# Patient Record
Sex: FEMALE | Race: WHITE | NOT HISPANIC OR LATINO | URBAN - METROPOLITAN AREA
[De-identification: names, ages, dates, MRNs, and addresses within clinical notes are randomized per-mention and may not be internally consistent; named-entity substitution may affect disease eponyms.]

---

## 2018-01-19 ENCOUNTER — OFFICE VISIT (OUTPATIENT)
Dept: URGENT CARE | Facility: PHYSICIAN GROUP | Age: 33
End: 2018-01-19
Payer: COMMERCIAL

## 2018-01-19 VITALS
WEIGHT: 225 LBS | HEART RATE: 88 BPM | HEIGHT: 68 IN | TEMPERATURE: 98 F | SYSTOLIC BLOOD PRESSURE: 138 MMHG | DIASTOLIC BLOOD PRESSURE: 80 MMHG | BODY MASS INDEX: 34.1 KG/M2 | OXYGEN SATURATION: 97 %

## 2018-01-19 DIAGNOSIS — E66.9 OBESITY (BMI 30-39.9): ICD-10-CM

## 2018-01-19 DIAGNOSIS — R05.9 COUGH: ICD-10-CM

## 2018-01-19 DIAGNOSIS — J06.9 UPPER RESPIRATORY TRACT INFECTION, UNSPECIFIED TYPE: ICD-10-CM

## 2018-01-19 PROCEDURE — 99214 OFFICE O/P EST MOD 30 MIN: CPT | Performed by: PHYSICIAN ASSISTANT

## 2018-01-19 RX ORDER — BENZONATATE 100 MG/1
100 CAPSULE ORAL 3 TIMES DAILY PRN
Qty: 60 CAP | Refills: 0 | Status: SHIPPED | OUTPATIENT
Start: 2018-01-19 | End: 2019-03-19

## 2018-01-19 RX ORDER — AZITHROMYCIN 250 MG/1
TABLET, FILM COATED ORAL
Qty: 1 QUANTITY SUFFICIENT | Refills: 0 | Status: SHIPPED | OUTPATIENT
Start: 2018-01-19 | End: 2019-03-19

## 2018-01-19 ASSESSMENT — ENCOUNTER SYMPTOMS
SPUTUM PRODUCTION: 1
FEVER: 1
ABDOMINAL PAIN: 0
SHORTNESS OF BREATH: 0
NAUSEA: 0
VOMITING: 0
CHILLS: 1
SORE THROAT: 0
MYALGIAS: 0
WHEEZING: 0
COUGH: 1
DIARRHEA: 0

## 2018-01-19 ASSESSMENT — PAIN SCALES - GENERAL: PAINLEVEL: NO PAIN

## 2018-01-19 NOTE — PROGRESS NOTES
"Subjective:   Daniella Benoit is a 32 y.o. female who presents for Cough (Cough x6 days. Pt has some chest congestion )        Cough   This is a new problem. The current episode started in the past 7 days. The problem has been waxing and waning. The cough is productive of sputum. Associated symptoms include chills, ear congestion, a fever and nasal congestion. Pertinent negatives include no ear pain, myalgias, rash, sore throat, shortness of breath or wheezing. Her past medical history is significant for environmental allergies.   notes last 6d of fever/chills, last night maxT 101, cough prod, some sinus congestion, denies much body aches through the day, denies nausea/voimting/abdpain/diarrhea/rash, denies ST/ear pain, denies pMH of asthma/bronchitis/pneumonia/seasonal allerg. Does have some allerg but not consistently. Tried using dayquil/ibu.     Review of Systems   Constitutional: Positive for chills, fever and malaise/fatigue.   HENT: Positive for congestion. Negative for ear pain and sore throat.    Respiratory: Positive for cough and sputum production. Negative for shortness of breath and wheezing.    Gastrointestinal: Negative for abdominal pain, diarrhea, nausea and vomiting.   Musculoskeletal: Negative for myalgias.   Skin: Negative for rash.   Endo/Heme/Allergies: Positive for environmental allergies.     No Known Allergies   Objective:   /80   Pulse 88   Temp 36.7 °C (98 °F)   Ht 1.727 m (5' 8\")   Wt 102.1 kg (225 lb)   SpO2 97%   Breastfeeding? No   BMI 34.21 kg/m²   Physical Exam   Constitutional: She is oriented to person, place, and time. She appears well-developed and well-nourished. No distress.   HENT:   Head: Normocephalic and atraumatic.   Right Ear: Tympanic membrane, external ear and ear canal normal.   Left Ear: Tympanic membrane, external ear and ear canal normal.   Nose: Nose normal.   Mouth/Throat: Uvula is midline and mucous membranes are normal. Posterior oropharyngeal " erythema ( mild PND) present. No oropharyngeal exudate, posterior oropharyngeal edema or tonsillar abscesses.   Eyes: Conjunctivae and lids are normal. Right eye exhibits no discharge. Left eye exhibits no discharge. No scleral icterus.   Neck: Neck supple.   Pulmonary/Chest: Effort normal and breath sounds normal. No respiratory distress. She has no decreased breath sounds. She has no wheezes. She has no rhonchi. She has no rales.   Musculoskeletal: Normal range of motion.   Lymphadenopathy:     She has cervical adenopathy ( mild bilat).   Neurological: She is alert and oriented to person, place, and time. She is not disoriented.   Skin: Skin is warm and dry. She is not diaphoretic. No erythema. No pallor.   Psychiatric: Her speech is normal and behavior is normal.   Nursing note and vitals reviewed.        Assessment/Plan:   Assessment    1. Upper respiratory tract infection, unspecified type  Supportive care is reviewed with patient/caregiver - recommend to push PO fluids and electrolytes, Nsaids/tylenol, netti pot/saline irrig, humidifier in home, flonase, ponaris, antihistamines, suspect resolving viral URI, pt would like abx, we discuss Contingent antibiotic prescription given to patient to fill upon meeting criteria of guidelines discussed.   If filling,  take full course of Rx, take with probiotics, observe for resolution  Return to clinic with lack of resolution or progression of symptoms.      - azithromycin (ZITHROMAX) 250 MG Tab; Take as directed on package. Dispense one package.  Dispense: 1 Quantity Sufficient; Refill: 0    2. Obesity (BMI 30-39.9)    - Patient identified as having weight management issue.  Appropriate orders and counseling given.    3. Cough    - benzonatate (TESSALON) 100 MG Cap; Take 1 Cap by mouth 3 times a day as needed for Cough.  Dispense: 60 Cap; Refill: 0

## 2019-03-19 ENCOUNTER — OFFICE VISIT (OUTPATIENT)
Dept: URGENT CARE | Facility: CLINIC | Age: 34
End: 2019-03-19
Payer: COMMERCIAL

## 2019-03-19 VITALS
RESPIRATION RATE: 16 BRPM | DIASTOLIC BLOOD PRESSURE: 84 MMHG | HEIGHT: 68 IN | SYSTOLIC BLOOD PRESSURE: 118 MMHG | HEART RATE: 114 BPM | TEMPERATURE: 101.5 F | WEIGHT: 225 LBS | BODY MASS INDEX: 34.1 KG/M2 | OXYGEN SATURATION: 100 %

## 2019-03-19 DIAGNOSIS — J02.0 ACUTE STREPTOCOCCAL PHARYNGITIS: ICD-10-CM

## 2019-03-19 DIAGNOSIS — R50.9 FEVER, UNSPECIFIED FEVER CAUSE: ICD-10-CM

## 2019-03-19 DIAGNOSIS — J02.9 SORE THROAT: ICD-10-CM

## 2019-03-19 LAB
FLUAV+FLUBV AG SPEC QL IA: NORMAL
INT CON NEG: NORMAL
INT CON NEG: NORMAL
INT CON POS: NORMAL
INT CON POS: NORMAL
S PYO AG THROAT QL: POSITIVE

## 2019-03-19 PROCEDURE — 87880 STREP A ASSAY W/OPTIC: CPT | Performed by: NURSE PRACTITIONER

## 2019-03-19 PROCEDURE — 87804 INFLUENZA ASSAY W/OPTIC: CPT | Performed by: NURSE PRACTITIONER

## 2019-03-19 PROCEDURE — 99214 OFFICE O/P EST MOD 30 MIN: CPT | Performed by: NURSE PRACTITIONER

## 2019-03-19 RX ORDER — IBUPROFEN 200 MG
800 TABLET ORAL ONCE
Status: COMPLETED | OUTPATIENT
Start: 2019-03-19 | End: 2019-03-19

## 2019-03-19 RX ORDER — AMOXICILLIN 500 MG/1
500 CAPSULE ORAL 2 TIMES DAILY
Qty: 20 CAP | Refills: 0 | Status: SHIPPED | OUTPATIENT
Start: 2019-03-19 | End: 2019-03-29

## 2019-03-19 RX ADMIN — Medication 800 MG: at 19:00

## 2019-03-19 ASSESSMENT — ENCOUNTER SYMPTOMS
DOUBLE VISION: 0
VOMITING: 0
CHILLS: 1
SPUTUM PRODUCTION: 0
COUGH: 0
STRIDOR: 0
NECK PAIN: 0
DIARRHEA: 0
SWOLLEN GLANDS: 1
NAUSEA: 0
SORE THROAT: 1
DIZZINESS: 0
CONSTIPATION: 0
ABDOMINAL PAIN: 0
PALPITATIONS: 0
TROUBLE SWALLOWING: 0
BLURRED VISION: 0
WHEEZING: 0
SHORTNESS OF BREATH: 0
FEVER: 1
HEADACHES: 0
MYALGIAS: 1

## 2019-03-19 NOTE — LETTER
March 19, 2019         Patient: Daniella Benoit   YOB: 1985   Date of Visit: 3/19/2019           To Whom it May Concern:    Daniella Benoit was seen in my clinic on 3/19/2019. Please excuse her from work 3/19/2019 through 3/20/2019. She may return 3/21/2019.    If you have any questions or concerns, please don't hesitate to call.        Sincerely,           DANE Roe.  Electronically Signed

## 2019-03-19 NOTE — PROGRESS NOTES
Subjective:   Daniella Benoit is a 33 y.o. female who presents for Pharyngitis (X1 day sore throat/fever)        Pharyngitis    This is a new problem. The current episode started yesterday. The problem has been waxing and waning. Neither side of throat is experiencing more pain than the other. The maximum temperature recorded prior to her arrival was 101 - 101.9 F. The pain is moderate. Associated symptoms include congestion and swollen glands. Pertinent negatives include no abdominal pain, coughing, diarrhea, ear discharge, ear pain, headaches, neck pain, shortness of breath, stridor, trouble swallowing or vomiting. She has tried NSAIDs for the symptoms. The treatment provided mild relief.        Review of Systems   Constitutional: Positive for chills and fever.   HENT: Positive for congestion and sore throat. Negative for ear discharge, ear pain and trouble swallowing.    Eyes: Negative for blurred vision and double vision.   Respiratory: Negative for cough, sputum production, shortness of breath, wheezing and stridor.    Cardiovascular: Negative for chest pain and palpitations.   Gastrointestinal: Negative for abdominal pain, constipation, diarrhea, nausea and vomiting.   Musculoskeletal: Positive for myalgias. Negative for neck pain.   Skin: Negative.  Negative for itching and rash.   Neurological: Negative for dizziness and headaches.   All other systems reviewed and are negative.    PMH:  has a past medical history of Body mass index (BMI) of 36.0-36.9 in adult (2/2/2015); Contraceptive management (2/2/2015); and Elevated blood pressure (2/2/2015).  MEDS:   Current Outpatient Prescriptions:   •  amoxicillin (AMOXIL) 500 MG Cap, Take 1 Cap by mouth 2 times a day for 10 days., Disp: 20 Cap, Rfl: 0  •  norethindrone-ethinyl estradiol (NORTREL 1/35, 28,) 1-35 MG-MCG per tablet, Take 1 Tab by mouth every day., Disp: , Rfl:   •  metformin (GLUCOPHAGE) 500 MG Tab, Take 500 mg by mouth 2 times a day, with meals., Disp:  ", Rfl:   •  omeprazole (PRILOSEC) 20 MG delayed-release capsule, Take 20 mg by mouth every day., Disp: , Rfl:     Current Facility-Administered Medications:   •  ibuprofen (MOTRIN) tablet 800 mg, 800 mg, Oral, Once, PHONG Roe  ALLERGIES: No Known Allergies  SURGHX:   Past Surgical History:   Procedure Laterality Date   • METACARPAL ORIF       SOCHX:  reports that she has never smoked. She has never used smokeless tobacco. She reports that she drinks alcohol. She reports that she does not use drugs.  FH: Family history was reviewed, no pertinent findings to report'   Objective:   /84 (BP Location: Left arm, Patient Position: Sitting, BP Cuff Size: Adult)   Pulse (!) 114   Temp (!) 38.6 °C (101.5 °F) (Temporal)   Resp 16   Ht 1.727 m (5' 8\")   Wt 102.1 kg (225 lb)   SpO2 100%   BMI 34.21 kg/m²   Physical Exam   Constitutional: She is oriented to person, place, and time. She appears well-developed and well-nourished. No distress.   HENT:   Head: Normocephalic.   Right Ear: Hearing, tympanic membrane and ear canal normal. Tympanic membrane is not erythematous. No middle ear effusion.   Left Ear: Hearing, tympanic membrane and ear canal normal. Tympanic membrane is not erythematous.  No middle ear effusion.   Nose: No rhinorrhea. Right sinus exhibits no maxillary sinus tenderness and no frontal sinus tenderness. Left sinus exhibits no maxillary sinus tenderness and no frontal sinus tenderness.   Mouth/Throat: Mucous membranes are normal. Posterior oropharyngeal erythema present. Tonsils are 1+ on the right. Tonsils are 1+ on the left. No tonsillar exudate.   Eyes: Pupils are equal, round, and reactive to light. Conjunctivae, EOM and lids are normal.   Neck: Normal range of motion. No thyromegaly present.   Cardiovascular: Normal rate, regular rhythm and normal heart sounds.    Pulmonary/Chest: Effort normal and breath sounds normal. No respiratory distress. She has no wheezes.   Lymphadenopathy: "        Head (right side): Tonsillar adenopathy present. No submandibular adenopathy present.        Head (left side): No submandibular and no tonsillar adenopathy present.   Neurological: She is alert and oriented to person, place, and time.   Skin: Skin is warm and dry. No rash noted. She is not diaphoretic.   Psychiatric: She has a normal mood and affect. Her speech is normal and behavior is normal. Judgment and thought content normal.   Vitals reviewed.        Assessment/Plan:   Assessment    1. Sore throat  - POCT Rapid Strep A  - POCT Influenza A/B    2. Fever, unspecified fever cause  - ibuprofen (MOTRIN) tablet 800 mg; Take 4 Tabs by mouth Once.    3. Acute streptococcal pharyngitis  - amoxicillin (AMOXIL) 500 MG Cap; Take 1 Cap by mouth 2 times a day for 10 days.  Dispense: 20 Cap; Refill: 0    Has not taken any Ibuprofen today    Rapid strep positive; negative Flu  Patient encouraged to increase clear liquid intake  Advised to change toothbrush in 2 days.    Differential diagnosis, natural history, supportive care, and indications for immediate follow-up discussed.

## 2019-11-20 ENCOUNTER — OFFICE VISIT (OUTPATIENT)
Dept: URGENT CARE | Facility: CLINIC | Age: 34
End: 2019-11-20
Payer: COMMERCIAL

## 2019-11-20 VITALS
OXYGEN SATURATION: 97 % | BODY MASS INDEX: 37.28 KG/M2 | WEIGHT: 246 LBS | TEMPERATURE: 97.8 F | SYSTOLIC BLOOD PRESSURE: 124 MMHG | HEIGHT: 68 IN | HEART RATE: 97 BPM | DIASTOLIC BLOOD PRESSURE: 76 MMHG | RESPIRATION RATE: 14 BRPM

## 2019-11-20 DIAGNOSIS — R05.9 COUGH: ICD-10-CM

## 2019-11-20 PROCEDURE — 99213 OFFICE O/P EST LOW 20 MIN: CPT | Performed by: PHYSICIAN ASSISTANT

## 2019-11-20 ASSESSMENT — ENCOUNTER SYMPTOMS
CHILLS: 0
FEVER: 0
SHORTNESS OF BREATH: 0
COUGH: 1
WHEEZING: 0
SPUTUM PRODUCTION: 0

## 2019-11-20 NOTE — PROGRESS NOTES
"  Subjective:   Daniella Benoit is a 34 y.o. female who presents today with   Chief Complaint   Patient presents with   • Cough       Cough   This is a new problem. The current episode started in the past 7 days. The problem has been unchanged. The problem occurs every few minutes. The cough is non-productive. Associated symptoms include nasal congestion and postnasal drip. Pertinent negatives include no chest pain, chills, fever, shortness of breath or wheezing. She has tried nothing for the symptoms. The treatment provided no relief.     Patient is also 10 weeks pregnant  PMH:  has a past medical history of Body mass index (BMI) of 36.0-36.9 in adult (2/2/2015), Contraceptive management (2/2/2015), and Elevated blood pressure (2/2/2015).  MEDS:   Current Outpatient Medications:   •  metformin (GLUCOPHAGE) 500 MG Tab, Take 500 mg by mouth 2 times a day, with meals., Disp: , Rfl:   •  omeprazole (PRILOSEC) 20 MG delayed-release capsule, Take 20 mg by mouth every day., Disp: , Rfl:   •  norethindrone-ethinyl estradiol (NORTREL 1/35, 28,) 1-35 MG-MCG per tablet, Take 1 Tab by mouth every day., Disp: , Rfl:   ALLERGIES: No Known Allergies  SURGHX:   Past Surgical History:   Procedure Laterality Date   • METACARPAL ORIF       SOCHX:  reports that she has never smoked. She has never used smokeless tobacco. She reports current alcohol use. She reports that she does not use drugs.  FH: Reviewed with patient, not pertinent to this visit.       Review of Systems   Constitutional: Negative for chills and fever.   HENT: Positive for postnasal drip.    Respiratory: Positive for cough. Negative for sputum production, shortness of breath and wheezing.    Cardiovascular: Negative for chest pain.   All other systems reviewed and are negative.       Objective:   /76 (BP Location: Left arm, Patient Position: Sitting)   Pulse 97   Temp 36.6 °C (97.8 °F)   Resp 14   Ht 1.727 m (5' 8\")   Wt 111.6 kg (246 lb)   SpO2 97%   BMI " 37.40 kg/m²   Physical Exam  Vitals signs and nursing note reviewed.   Constitutional:       General: She is not in acute distress.     Appearance: She is well-developed.   HENT:      Head: Normocephalic and atraumatic.      Right Ear: Hearing normal.      Left Ear: Hearing normal.   Eyes:      Pupils: Pupils are equal, round, and reactive to light.   Cardiovascular:      Rate and Rhythm: Normal rate and regular rhythm.      Heart sounds: Normal heart sounds.   Pulmonary:      Effort: Pulmonary effort is normal.      Breath sounds: Normal breath sounds. No stridor. No wheezing, rhonchi or rales.   Musculoskeletal:      Comments: Normal movement in all 4 extremities   Skin:     General: Skin is warm and dry.   Neurological:      Mental Status: She is alert.      Coordination: Coordination normal.       Assessment/Plan:   Assessment    1. Cough  Discussed pregnancy safe remedies for cough including cough drops, humidifier, hydration, rest, ashlie and she will also follow-up with her OB/GYN to get other recommendations.  No antibiotics indicated at this time.  Differential diagnosis, natural history, supportive care, and indications for immediate follow-up discussed.   Patient given instructions and understanding of medications and treatment.    If not improving in 3-5 days, F/U with PCP or return to  if symptoms worsen.    Patient agreeable to plan.      Please note that this dictation was created using voice recognition software. I have made every reasonable attempt to correct obvious errors, but I expect that there are errors of grammar and possibly content that I did not discover before finalizing the note.    Max Garcia PA-C

## 2021-12-14 ENCOUNTER — OFFICE VISIT (OUTPATIENT)
Dept: URGENT CARE | Facility: CLINIC | Age: 36
End: 2021-12-14
Payer: COMMERCIAL

## 2021-12-14 VITALS
OXYGEN SATURATION: 98 % | HEART RATE: 76 BPM | HEIGHT: 67 IN | RESPIRATION RATE: 12 BRPM | TEMPERATURE: 97.7 F | WEIGHT: 245 LBS | DIASTOLIC BLOOD PRESSURE: 80 MMHG | BODY MASS INDEX: 38.45 KG/M2 | SYSTOLIC BLOOD PRESSURE: 118 MMHG

## 2021-12-14 DIAGNOSIS — J01.40 ACUTE NON-RECURRENT PANSINUSITIS: ICD-10-CM

## 2021-12-14 DIAGNOSIS — H10.32 ACUTE BACTERIAL CONJUNCTIVITIS OF LEFT EYE: ICD-10-CM

## 2021-12-14 PROCEDURE — 99214 OFFICE O/P EST MOD 30 MIN: CPT | Performed by: PHYSICIAN ASSISTANT

## 2021-12-14 RX ORDER — AMOXICILLIN AND CLAVULANATE POTASSIUM 875; 125 MG/1; MG/1
1 TABLET, FILM COATED ORAL 2 TIMES DAILY
Qty: 14 TABLET | Refills: 0 | Status: SHIPPED | OUTPATIENT
Start: 2021-12-14 | End: 2021-12-21

## 2021-12-14 RX ORDER — OFLOXACIN 3 MG/ML
1 SOLUTION/ DROPS OPHTHALMIC 4 TIMES DAILY
Qty: 10 ML | Refills: 0 | Status: SHIPPED | OUTPATIENT
Start: 2021-12-14 | End: 2021-12-21

## 2021-12-14 ASSESSMENT — ENCOUNTER SYMPTOMS
DIAPHORESIS: 0
EYE REDNESS: 1
EYE DISCHARGE: 1
SORE THROAT: 0
SINUS PAIN: 1
SHORTNESS OF BREATH: 0
DIZZINESS: 0
STRIDOR: 0
FEVER: 0
NAUSEA: 0
PHOTOPHOBIA: 0
DOUBLE VISION: 0
ABDOMINAL PAIN: 0
VOMITING: 0
CHILLS: 0
EYE PAIN: 0
MYALGIAS: 0
SPUTUM PRODUCTION: 0
WHEEZING: 0
PALPITATIONS: 0
BLURRED VISION: 0
DIARRHEA: 0
COUGH: 1
HEADACHES: 0

## 2021-12-14 NOTE — PROGRESS NOTES
Subjective:   Daniella Benoit is a 36 y.o. female who presents for Sinus Problem (congestion/head and face pressure/cough x1 week )      HPI:  This is a very pleasant 36-year-old female presenting to the clinic with sinus pain and pressure x1 week.  Patient states her symptoms started 7 days ago with what she thought was a cold.  Over the last 3 days she has had progressively worsening pain and pressure over the maxillary sinuses.  Pain is worse when she leans forward.  Occasionally has intermittent headaches.  Occasional cough in the morning that improves throughout the day.  No shortness of breath or chest pain.  She has not been running a fever.  Denies any body aches or chills.  No known ill contacts.  Has been taking DayQuil and NyQuil with minimal improvement.  She also states last night her left eye crusted up and she had to peel the lids apart this morning.  Continues to be slightly red and goopy this morning.  No visual change or pain with eye movement.      Review of Systems   Constitutional: Negative for chills, diaphoresis, fever and malaise/fatigue.   HENT: Positive for congestion, ear pain and sinus pain. Negative for sore throat.    Eyes: Positive for discharge and redness. Negative for blurred vision, double vision, photophobia and pain.   Respiratory: Positive for cough. Negative for sputum production, shortness of breath, wheezing and stridor.    Cardiovascular: Negative for chest pain and palpitations.   Gastrointestinal: Negative for abdominal pain, diarrhea, nausea and vomiting.   Musculoskeletal: Negative for myalgias.   Neurological: Negative for dizziness and headaches.   Endo/Heme/Allergies: Negative for environmental allergies.       Medications:    • amoxicillin-clavulanate Tabs  • metFORMIN Tabs  • norethindrone-ethinyl estradiol  • ofloxacin Soln  • omeprazole    Allergies: Patient has no known allergies.    Problem List: Daniella Benoit does not have any pertinent problems on  "file.    Surgical History:  Past Surgical History:   Procedure Laterality Date   • METACARPAL ORIF         Past Social Hx: Daniella Benoit  reports that she has never smoked. She has never used smokeless tobacco. She reports current alcohol use. She reports that she does not use drugs.     Past Family Hx:  Daniella Benoit family history includes Alcohol/Drug in her maternal grandmother; Diabetes in her paternal grandfather.     Problem list, medications, and allergies reviewed by myself today in Epic.     Objective:     /80 (BP Location: Right arm, Patient Position: Sitting, BP Cuff Size: Adult)   Pulse 76   Temp 36.5 °C (97.7 °F) (Temporal)   Resp 12   Ht 1.702 m (5' 7\")   Wt 111 kg (245 lb)   LMP 12/01/2021   SpO2 98%   Breastfeeding No   BMI 38.37 kg/m²     Physical Exam  Constitutional:       General: She is not in acute distress.     Appearance: Normal appearance. She is not ill-appearing, toxic-appearing or diaphoretic.   HENT:      Head: Normocephalic and atraumatic.      Comments: Tenderness to palpation of bilateral maxillary sinuses.     Right Ear: Ear canal and external ear normal.      Left Ear: Ear canal and external ear normal.      Ears:      Comments: Bilateral TMs bulging with purulence appreciated behind the TMs.     Nose: Congestion present. No rhinorrhea.      Mouth/Throat:      Mouth: Mucous membranes are moist.      Pharynx: No oropharyngeal exudate or posterior oropharyngeal erythema.   Eyes:      Comments: Right conjunctiva is moderately injected.  No discharge or drainage present.  No lid crusting.  EOMs full intact and pain-free.  PERRLA.  No periorbital swelling or redness.   Cardiovascular:      Rate and Rhythm: Normal rate and regular rhythm.      Pulses: Normal pulses.      Heart sounds: Normal heart sounds.   Pulmonary:      Effort: Pulmonary effort is normal.      Breath sounds: Normal breath sounds. No wheezing, rhonchi or rales.   Musculoskeletal:      Cervical back: " Normal range of motion. No muscular tenderness.   Lymphadenopathy:      Cervical: No cervical adenopathy.   Skin:     General: Skin is warm and dry.      Capillary Refill: Capillary refill takes less than 2 seconds.   Neurological:      Mental Status: She is alert.   Psychiatric:         Mood and Affect: Mood normal.         Thought Content: Thought content normal.           Assessment/Plan:     Comments/MDM:     Nasal spray and allergy medications as directed (Zyrtec or Loratadine).  Antibiotic as directed.  Complete topical antibiotic for conjunctivitis.  Refrain from contact lens use while taking antibiotic.  You may try saline irrigation or neti pot.   Drink plenty of fluids.  Ibuprofen or Tylenol as directed for pain.   Warm compress to sinuses.      • Follow up with primary care provider. Urgently for worsening symptoms, persistent fevers, facial swelling, visual changes, weakness, elevated heart rate, stiff neck, symptoms last longer than 10 days, or any other concerns.     Diagnosis and associated orders:     1. Acute non-recurrent pansinusitis  amoxicillin-clavulanate (AUGMENTIN) 875-125 MG Tab   2. Acute bacterial conjunctivitis of left eye  ofloxacin (OCUFLOX) 0.3 % Solution            Differential diagnosis, natural history, supportive care, and indications for immediate follow-up discussed.    I personally reviewed prior external notes and test results pertinent to today's visit.     Advised the patient to follow-up with the primary care physician for recheck, reevaluation, and consideration of further management.    Please note that this dictation was created using voice recognition software. I have made reasonable attempt to correct obvious errors, but I expect that there are errors of grammar and possibly content that I did not discover before finalizing the note.    This note was electronically signed by SAMANTHA Hardin PA-C

## 2021-12-26 ENCOUNTER — OFFICE VISIT (OUTPATIENT)
Dept: URGENT CARE | Facility: CLINIC | Age: 36
End: 2021-12-26
Payer: COMMERCIAL

## 2021-12-26 VITALS
TEMPERATURE: 98 F | HEIGHT: 67 IN | OXYGEN SATURATION: 99 % | DIASTOLIC BLOOD PRESSURE: 100 MMHG | WEIGHT: 240 LBS | HEART RATE: 77 BPM | BODY MASS INDEX: 37.67 KG/M2 | RESPIRATION RATE: 16 BRPM | SYSTOLIC BLOOD PRESSURE: 142 MMHG

## 2021-12-26 DIAGNOSIS — H10.32 ACUTE CONJUNCTIVITIS OF LEFT EYE, UNSPECIFIED ACUTE CONJUNCTIVITIS TYPE: ICD-10-CM

## 2021-12-26 DIAGNOSIS — R09.81 NASAL CONGESTION: ICD-10-CM

## 2021-12-26 PROCEDURE — 99213 OFFICE O/P EST LOW 20 MIN: CPT | Performed by: NURSE PRACTITIONER

## 2021-12-26 RX ORDER — POLYMYXIN B SULFATE AND TRIMETHOPRIM 1; 10000 MG/ML; [USP'U]/ML
1 SOLUTION OPHTHALMIC 4 TIMES DAILY
Qty: 10 ML | Refills: 0 | Status: SHIPPED | OUTPATIENT
Start: 2021-12-26 | End: 2022-01-02

## 2021-12-26 RX ORDER — OLOPATADINE HYDROCHLORIDE 1 MG/ML
1 SOLUTION/ DROPS OPHTHALMIC 2 TIMES DAILY
Qty: 5 ML | COMMUNITY
Start: 2021-12-26 | End: 2022-01-07

## 2021-12-26 ASSESSMENT — ENCOUNTER SYMPTOMS
FEVER: 0
SINUS PRESSURE: 1
SHORTNESS OF BREATH: 0
SORE THROAT: 1
COUGH: 0

## 2021-12-26 NOTE — PATIENT INSTRUCTIONS
Allergic Conjunctivitis, Adult    Allergic conjunctivitis is inflammation of the clear membrane that covers the white part of your eye and the inner surface of your eyelid (conjunctiva). The inflammation is caused by allergies. The blood vessels in the conjunctiva become inflamed and this causes the eyes to become red or pink. The eyes often feel itchy. Allergic conjunctivitis cannot be spread from one person to another person (is not contagious).  What are the causes?  This condition is caused by an allergic reaction. Common causes of an allergic reaction (allergens) include:  · Outdoor allergens, such as:  ? Pollen.  ? Grass and weeds.  ? Mold spores.  · Indoor allergens, such as:  ? Dust.  ? Smoke.  ? Mold.  ? Pet dander.  ? Animal hair.  What increases the risk?  You may be more likely to develop this condition if you have a family history of allergies, such as:  · Allergic rhinitis.  · Bronchial asthma.  · Atopic dermatitis.  What are the signs or symptoms?  Symptoms of this condition include eyes that are:  · Itchy.  · Red.  · Watery.  · Puffy.  Your eyes may also:  · Sting or burn.  · Have clear drainage coming from them.  How is this diagnosed?  This condition may be diagnosed by medical history and physical exam. If you have drainage from your eyes, it may be tested to rule out other causes of conjunctivitis. You may also need to see a health care provider who specializes in treating allergies (allergist) or eye conditions (ophthalmologist) for tests to confirm the diagnosis. You may have:  · Skin tests to see which allergens are causing your symptoms. These tests involve pricking the skin with a tiny needle and exposing the skin to small amounts of potential allergens to see if your skin reacts.  · Blood tests.  · Tissue scrapings from your eyelid. These will be examined under a microscope.  How is this treated?  Treatments for this condition may include:  · Cold cloths (compresses) to soothe itching and  swelling.  · Washing the face to remove allergens.  · Eye drops. These may be prescription or over-the-counter. There are several different types. You may need to try different types to see which one works best for you. Your may need:  ? Eye drops that block the allergic reaction (antihistamine).  ? Eye drops that reduce swelling and irritation (anti-inflammatory).  ? Steroid eye drops to lessen a severe reaction (vernal conjunctivitis).  · Oral antihistamine medicines to reduce your allergic reaction. You may need these if eye drops do not help or are difficult to use.  Follow these instructions at home:  · Avoid known allergens whenever possible.  · Take or apply over-the-counter and prescription medicines only as told by your health care provider. These include any eye drops.  · Apply a cool, clean washcloth to your eye for 10-20 minutes, 3-4 times a day.  · Do not touch or rub your eyes.  · Do not wear contact lenses until the inflammation is gone. Wear glasses instead.  · Do not wear eye makeup until the inflammation is gone.  · Keep all follow-up visits as told by your health care provider. This is important.  Contact a health care provider if:  · Your symptoms get worse or do not improve with treatment.  · You have mild eye pain.  · You have sensitivity to light.  · You have spots or blisters on your eyes.  · You have pus draining from your eye.  · You have a fever.  Get help right away if:  · You have redness, swelling, or other symptoms in only one eye.  · Your vision is blurred or you have vision changes.  · You have severe eye pain.  This information is not intended to replace advice given to you by your health care provider. Make sure you discuss any questions you have with your health care provider.  Document Released: 03/09/2004 Document Revised: 11/30/2018 Document Reviewed: 06/30/2017  [employer]vier Patient Education © 2020 [employer]

## 2021-12-26 NOTE — PROGRESS NOTES
Subjective:     Daniella Benoit is a 36 y.o. female who presents for Sinus Problem (was here 2 weeks ago for sinus problem but the symptoms came back)      Allergy issues from running heater. Pharyngitis. Was taking allergy medications. Eyes were gooey this morning. No itciness or grittiness. Congested at night. Wears contacts. Not a new brand. Sates sinus symptoms do not feel like a cold. Possible pink eye exposure 1 month ago, son had goopy eyes.       Sinus Problem  This is a new problem. The current episode started 1 to 4 weeks ago. The problem has been waxing and waning since onset. Associated symptoms include congestion, sinus pressure and a sore throat. Pertinent negatives include no coughing, ear pain or shortness of breath.       Past Medical History:   Diagnosis Date   • Body mass index (BMI) of 36.0-36.9 in adult 2/2/2015   • Contraceptive management 2/2/2015   • Elevated blood pressure 2/2/2015       Past Surgical History:   Procedure Laterality Date   • ORIF, FRACTURE, METACARPAL BONE         Social History     Socioeconomic History   • Marital status:      Spouse name: Not on file   • Number of children: Not on file   • Years of education: Not on file   • Highest education level: Not on file   Occupational History   • Not on file   Tobacco Use   • Smoking status: Never Smoker   • Smokeless tobacco: Never Used   Substance and Sexual Activity   • Alcohol use: Yes     Comment: rarely   • Drug use: No   • Sexual activity: Yes     Partners: Male   Other Topics Concern   • Not on file   Social History Narrative   • Not on file     Social Determinants of Health     Financial Resource Strain:    • Difficulty of Paying Living Expenses: Not on file   Food Insecurity:    • Worried About Running Out of Food in the Last Year: Not on file   • Ran Out of Food in the Last Year: Not on file   Transportation Needs:    • Lack of Transportation (Medical): Not on file   • Lack of Transportation (Non-Medical): Not on  "file   Physical Activity:    • Days of Exercise per Week: Not on file   • Minutes of Exercise per Session: Not on file   Stress:    • Feeling of Stress : Not on file   Social Connections:    • Frequency of Communication with Friends and Family: Not on file   • Frequency of Social Gatherings with Friends and Family: Not on file   • Attends [employer] Services: Not on file   • Active Member of Clubs or Organizations: Not on file   • Attends Club or Organization Meetings: Not on file   • Marital Status: Not on file   Intimate Partner Violence:    • Fear of Current or Ex-Partner: Not on file   • Emotionally Abused: Not on file   • Physically Abused: Not on file   • Sexually Abused: Not on file   Housing Stability:    • Unable to Pay for Housing in the Last Year: Not on file   • Number of Places Lived in the Last Year: Not on file   • Unstable Housing in the Last Year: Not on file        Family History   Problem Relation Age of Onset   • Alcohol/Drug Maternal Grandmother    • Diabetes Paternal Grandfather         No Known Allergies    Review of Systems   Constitutional: Negative for fever.   HENT: Positive for congestion, sinus pressure and sore throat. Negative for ear pain.    Eyes: Positive for discharge. Negative for blurred vision, double vision and pain.   Respiratory: Negative for cough and shortness of breath.    Endo/Heme/Allergies: Positive for environmental allergies.   All other systems reviewed and are negative.       Objective:   /100 Comment: took the BP 2 times on the R arm  Pulse 77   Temp 36.7 °C (98 °F)   Resp 16   Ht 1.702 m (5' 7\")   Wt 109 kg (240 lb)   LMP 12/01/2021   SpO2 99%   BMI 37.59 kg/m²     Physical Exam  Vitals reviewed.   Constitutional:       General: She is not in acute distress.     Appearance: She is well-developed.   HENT:      Head: Normocephalic and atraumatic.      Right Ear: Tympanic membrane, ear canal and external ear normal.      Left Ear: Tympanic membrane, ear " canal and external ear normal.      Nose: Mucosal edema present. No congestion.      Mouth/Throat:      Lips: Pink.      Mouth: Mucous membranes are moist.      Pharynx: Oropharynx is clear.   Eyes:      General: Lids are normal.      Extraocular Movements: Extraocular movements intact.      Conjunctiva/sclera:      Left eye: Left conjunctiva is injected. No chemosis, exudate or hemorrhage.     Pupils: Pupils are equal, round, and reactive to light.      Comments: Mildly injected. No exudate.    Cardiovascular:      Rate and Rhythm: Normal rate.   Pulmonary:      Effort: Pulmonary effort is normal. No respiratory distress.   Musculoskeletal:         General: Normal range of motion.      Cervical back: Normal range of motion.   Skin:     General: Skin is warm and dry.      Findings: No rash.   Neurological:      General: No focal deficit present.      Mental Status: She is alert and oriented to person, place, and time.      GCS: GCS eye subscore is 4. GCS verbal subscore is 5. GCS motor subscore is 6.   Psychiatric:         Mood and Affect: Mood normal.         Speech: Speech normal.         Behavior: Behavior normal.         Thought Content: Thought content normal.         Judgment: Judgment normal.         Assessment/Plan:   1. Acute conjunctivitis of left eye, unspecified acute conjunctivitis type  - polymixin-trimethoprim (POLYTRIM) 37718-7.1 UNIT/ML-% Solution; Administer 1 Drop into the left eye 4 times a day for 7 days.  Dispense: 10 mL; Refill: 0    2. Nasal congestion    Other orders  - olopatadine (PATANOL) 0.1 % ophthalmic solution; Administer 1 Drop into both eyes 2 times a day.  Dispense: 5 mL  -Nasal spray and allergy medications as directed (Zyrtec or Loratadine).  -You may try saline irrigation or neti pot.   -Drink plenty of fluids.  -Ibuprofen or Tylenol as directed for pain.   -Warm compress to sinuses.    -Eyelid and hand hygiene.    Follow up for persistent or worsening symptoms, increased  redness or swelling, vision changes, decreased eye movement, new or increased pain, or fever. Urgently for worsening symptoms, persistent fevers, facial swelling, visual changes, weakness, elevated heart rate, stiff neck, persistent sinus symptoms, or any other concerns.    Initiated Augmentin 12/14. Contingent for S&S of bacterial conjunctivitis. Discussed viral vs allergic causes.     Differential diagnosis, natural history, supportive care, and indications for immediate follow-up discussed.

## 2022-01-03 ASSESSMENT — ENCOUNTER SYMPTOMS
DOUBLE VISION: 0
EYE PAIN: 0
BLURRED VISION: 0
EYE DISCHARGE: 1

## 2022-01-07 ENCOUNTER — OFFICE VISIT (OUTPATIENT)
Dept: URGENT CARE | Facility: CLINIC | Age: 37
End: 2022-01-07
Payer: COMMERCIAL

## 2022-01-07 VITALS
DIASTOLIC BLOOD PRESSURE: 84 MMHG | BODY MASS INDEX: 37.67 KG/M2 | OXYGEN SATURATION: 100 % | HEIGHT: 67 IN | HEART RATE: 100 BPM | TEMPERATURE: 98.1 F | SYSTOLIC BLOOD PRESSURE: 134 MMHG | WEIGHT: 240 LBS | RESPIRATION RATE: 16 BRPM

## 2022-01-07 DIAGNOSIS — R09.82 POSTNASAL DRIP: ICD-10-CM

## 2022-01-07 DIAGNOSIS — J02.9 SORE THROAT: ICD-10-CM

## 2022-01-07 LAB
INT CON NEG: NORMAL
INT CON POS: NORMAL
S PYO AG THROAT QL: NEGATIVE

## 2022-01-07 PROCEDURE — 99213 OFFICE O/P EST LOW 20 MIN: CPT | Performed by: PHYSICIAN ASSISTANT

## 2022-01-07 PROCEDURE — 87880 STREP A ASSAY W/OPTIC: CPT | Performed by: PHYSICIAN ASSISTANT

## 2022-01-07 RX ORDER — FLUTICASONE PROPIONATE 50 MCG
1 SPRAY, SUSPENSION (ML) NASAL DAILY
Qty: 16 G | Refills: 0 | Status: SHIPPED | OUTPATIENT
Start: 2022-01-07

## 2022-01-07 ASSESSMENT — ENCOUNTER SYMPTOMS
SHORTNESS OF BREATH: 0
SORE THROAT: 1
TROUBLE SWALLOWING: 0
FEVER: 0
CHILLS: 0
COUGH: 0

## 2022-01-07 NOTE — PROGRESS NOTES
Subjective:   Daniella Benoit is a 36 y.o. female who presents today with   Chief Complaint   Patient presents with   • Pharyngitis     x 3 weeks     Pharyngitis   This is a new problem. The current episode started 1 to 4 weeks ago. The problem has been unchanged. There has been no fever. The pain is mild. Associated symptoms include ear pain (fullness). Pertinent negatives include no coughing, shortness of breath or trouble swallowing. Treatments tried: Mucinex DM yesterday. The treatment provided moderate relief.     I personally donned proper PPE throughout visit today.   Patient states she has had allergy-like symptoms for about 3 or 4 weeks.  She has no concerns for Covid at this time.  States that she has continued to have a sore throat mainly worse in the morning.  PMH:  has a past medical history of Body mass index (BMI) of 36.0-36.9 in adult (2/2/2015), Contraceptive management (2/2/2015), and Elevated blood pressure (2/2/2015).  MEDS:   Current Outpatient Medications:   •  fluticasone (FLONASE) 50 MCG/ACT nasal spray, Administer 1 Spray into affected nostril(S) every day., Disp: 16 g, Rfl: 0  •  norethindrone-ethinyl estradiol (NORTREL 1/35, 28,) 1-35 MG-MCG per tablet, Take 1 Tab by mouth every day., Disp: , Rfl:   ALLERGIES: No Known Allergies  SURGHX:   Past Surgical History:   Procedure Laterality Date   • ORIF, FRACTURE, METACARPAL BONE       SOCHX:  reports that she has never smoked. She has never used smokeless tobacco. She reports previous alcohol use. She reports that she does not use drugs.  FH: Reviewed with patient, not pertinent to this visit.     Review of Systems   Constitutional: Negative for chills and fever.   HENT: Positive for ear pain (fullness) and sore throat. Negative for trouble swallowing.    Respiratory: Negative for cough and shortness of breath.         Objective:   /84 (BP Location: Left arm, Patient Position: Sitting, BP Cuff Size: Adult long)   Pulse 100   Temp 36.7  "°C (98.1 °F) (Temporal)   Resp 16   Ht 1.702 m (5' 7\")   Wt 109 kg (240 lb)   SpO2 100%   BMI 37.59 kg/m²   Physical Exam  Vitals and nursing note reviewed.   Constitutional:       General: She is not in acute distress.     Appearance: Normal appearance. She is well-developed. She is not ill-appearing or toxic-appearing.   HENT:      Head: Normocephalic and atraumatic.      Right Ear: Hearing and ear canal normal. No decreased hearing noted. A middle ear effusion is present. Tympanic membrane is not erythematous or bulging.      Left Ear: Hearing and ear canal normal. No decreased hearing noted. A middle ear effusion is present. Tympanic membrane is not erythematous or bulging.      Mouth/Throat:      Pharynx: Posterior oropharyngeal erythema present. No oropharyngeal exudate.   Eyes:      Conjunctiva/sclera: Conjunctivae normal.   Cardiovascular:      Rate and Rhythm: Normal rate and regular rhythm.      Heart sounds: Normal heart sounds.   Pulmonary:      Effort: Pulmonary effort is normal.      Breath sounds: Normal breath sounds. No stridor. No wheezing, rhonchi or rales.   Musculoskeletal:      Comments: Normal movement in all 4 extremities   Lymphadenopathy:      Cervical: No cervical adenopathy.   Skin:     General: Skin is warm and dry.   Neurological:      Mental Status: She is alert.      Coordination: Coordination normal.   Psychiatric:         Mood and Affect: Mood normal.         STREP A Negative    Assessment/Plan:   Assessment    1. Sore throat  - POCT Rapid Strep A    2. Postnasal drip  - fluticasone (FLONASE) 50 MCG/ACT nasal spray; Administer 1 Spray into affected nostril(S) every day.  Dispense: 16 g; Refill: 0  Symptoms and presentation are consistent with sore throat caused by postnasal drainage.  Given that symptoms improved with Mucinex DM recommend that she continue with this treatment and also add on Flonase to help with the postnasal drainage which is most likely causing her symptoms.  " Offered Covid testing today but patient declines.  Differential diagnosis, natural history, supportive care, and indications for immediate follow-up discussed.   Patient given instructions and understanding of medications and treatment.    If not improving in 3-5 days, F/U with PCP or return to UC if symptoms worsen.    Patient agreeable to plan.      Please note that this dictation was created using voice recognition software. I have made every reasonable attempt to correct obvious errors, but I expect that there are errors of grammar and possibly content that I did not discover before finalizing the note.    Max Garcia PA-C

## 2022-01-09 ENCOUNTER — OFFICE VISIT (OUTPATIENT)
Dept: URGENT CARE | Facility: CLINIC | Age: 37
End: 2022-01-09
Payer: COMMERCIAL

## 2022-01-09 VITALS
SYSTOLIC BLOOD PRESSURE: 144 MMHG | TEMPERATURE: 98.9 F | HEART RATE: 76 BPM | BODY MASS INDEX: 37.51 KG/M2 | RESPIRATION RATE: 14 BRPM | WEIGHT: 239 LBS | HEIGHT: 67 IN | DIASTOLIC BLOOD PRESSURE: 92 MMHG | OXYGEN SATURATION: 96 %

## 2022-01-09 DIAGNOSIS — R09.81 SINUS CONGESTION: ICD-10-CM

## 2022-01-09 DIAGNOSIS — Z88.9 HISTORY OF SEASONAL ALLERGIES: ICD-10-CM

## 2022-01-09 PROCEDURE — 99214 OFFICE O/P EST MOD 30 MIN: CPT | Performed by: NURSE PRACTITIONER

## 2022-01-09 RX ORDER — METHYLPREDNISOLONE 4 MG/1
4 TABLET ORAL DAILY
Qty: 21 EACH | Refills: 0 | Status: SHIPPED | OUTPATIENT
Start: 2022-01-09

## 2022-01-09 NOTE — PROGRESS NOTES
Chief Complaint   Patient presents with   • Sinus Problem     Upper respiratory, sinus pressure symptoms started yesteday   • Nasal Congestion     x1 day   • Ear Pain     x1 day       HISTORY OF PRESENT ILLNESS: Patient is a pleasant 36 y.o. female who presents today due to approx 3 weeks of nasal congestion.  She was treated for sinus infection several weeks ago, completed those antibiotics.  She continues to have sinus congestion and ear fullness.  She denies any significant sinus pressure.  She has recently started taking oral allergy medication, has a history of seasonal allergies.  She has been prescribed Flonase but is only used a few days of the medication.    Patient Active Problem List    Diagnosis Date Noted   • Obesity (BMI 30-39.9) 01/19/2018   • Contraceptive management 02/02/2015   • Elevated blood pressure 02/02/2015   • Body mass index (BMI) of 36.0-36.9 in adult 02/02/2015       Allergies:Patient has no known allergies.    Current Outpatient Medications Ordered in Epic   Medication Sig Dispense Refill   • methylPREDNISolone (MEDROL DOSEPAK) 4 MG Tablet Therapy Pack Take 1 Tablet by mouth every day. Take with food. 21 Each 0   • fluticasone (FLONASE) 50 MCG/ACT nasal spray Administer 1 Spray into affected nostril(S) every day. 16 g 0   • norethindrone-ethinyl estradiol (NORTREL 1/35, 28,) 1-35 MG-MCG per tablet Take 1 Tab by mouth every day.       No current [facility]-ordered facility-administered medications on file.       Past Medical History:   Diagnosis Date   • Body mass index (BMI) of 36.0-36.9 in adult 2/2/2015   • Contraceptive management 2/2/2015   • Elevated blood pressure 2/2/2015       Social History     Tobacco Use   • Smoking status: Never Smoker   • Smokeless tobacco: Never Used   Vaping Use   • Vaping Use: Never used   Substance Use Topics   • Alcohol use: Not Currently     Comment: rarely   • Drug use: No       Family Status   Relation Name Status   • Mo  Alive   • Fa  Alive   • Sis   "Alive   • MGMo     • MGFa     • PGMo  Alive   • PGFa       Family History   Problem Relation Age of Onset   • Alcohol/Drug Maternal Grandmother    • Diabetes Paternal Grandfather        ROS:  Review of Systems   Constitutional: Negative for fever, chills, fatigue. Negative for weight loss.   HENT: Positive for ear pressure, nasal congestion. Negative for ear pain, nosebleeds, neck pain.    Eyes: Negative for vision changes.   Neuro: Positive for headache. Negative for sensory changes, weakness, seizure, LOC.  Cardiovascular: Negative for chest pain, palpitations, orthopnea and leg swelling.   Respiratory: Negative for cough, sputum production, shortness of breath and wheezing.   Gastrointestinal: Negative for abdominal pain, nausea, vomiting or diarrhea.    Skin: Negative for rash, diaphoresis.     Exam:  /92 (BP Location: Left arm, Patient Position: Sitting, BP Cuff Size: Adult)   Pulse 76   Temp 37.2 °C (98.9 °F) (Temporal)   Resp 14   Ht 1.702 m (5' 7\")   Wt 108 kg (239 lb)   SpO2 96%   General: well-nourished, well-developed female in NAD  Head: normocephalic, atraumatic  Eyes: PERRLA, no conjunctival injection, acuity grossly intact, lids normal.  Ears: normal shape and symmetry, no tenderness, no discharge. External canals are without any significant edema or erythema. Tympanic membranes are without any inflammation, scant bilateral effusion. Gross auditory acuity is intact.  Nose: symmetrical without tenderness, erythema and swelling noted bilateral turbinates, clear discharge.  No sinus tenderness.   Mouth/Throat: reasonable hygiene, no exudates or tonsillar enlargement. Erythema is present.   Neck: no masses, range of motion within normal limits, no tracheal deviation. No obvious thyroid enlargement.   Lymph: no cervical adenopathy. No supraclavicular adenopathy.   Neuro: alert and oriented. Cranial nerves 1-12 grossly intact. No sensory deficit.   Cardiovascular: regular " rate and rhythm. No edema.  Pulmonary: no distress. Chest is symmetrical with respiration, no wheezes, crackles, or rhonchi.   Musculoskeletal: no clubbing, appropriate muscle tone, gait is stable.  Skin: warm, dry, intact, no clubbing, no cyanosis, no rashes.   Psych: appropriate mood, affect, judgement.         Assessment/Plan:  1. Sinus congestion  methylPREDNISolone (MEDROL DOSEPAK) 4 MG Tablet Therapy Pack   2. History of seasonal allergies           Patient presents with ongoing sinus congestion.  No signs of bacterial process at this point, the patient did receive appropriate antibiotic therapy.  At this point, we will treat the patient with Medrol.  Continue oral allergy medication, suspect chronic seasonal allergies involved. Sleep with HOB elevated, humidifier at night, rest, increase fluid intake.   Supportive care, differential diagnoses, and indications for immediate follow-up discussed with patient.   Pathogenesis of diagnosis discussed including typical length and natural progression.   Instructed to return to clinic or nearest emergency department for any change in condition, further concerns, or worsening of symptoms.  Patient states understanding of the plan of care and discharge instructions.  Instructed to make an appointment, for follow up, with her primary care provider.  Previous clinic visit encounter reviewed and considered in medical decision making today.         Please note that this dictation was created using voice recognition software. I have made every reasonable attempt to correct obvious errors, but I expect that there are errors of grammar and possibly content that I did not discover before finalizing the note. N95 and safety glasses used for entire visit.       DANE Jaime.

## 2022-12-23 ENCOUNTER — OFFICE VISIT (OUTPATIENT)
Dept: URGENT CARE | Facility: CLINIC | Age: 37
End: 2022-12-23
Payer: COMMERCIAL

## 2022-12-23 VITALS
BODY MASS INDEX: 39.24 KG/M2 | RESPIRATION RATE: 16 BRPM | OXYGEN SATURATION: 97 % | HEART RATE: 124 BPM | HEIGHT: 67 IN | TEMPERATURE: 99.7 F | SYSTOLIC BLOOD PRESSURE: 124 MMHG | DIASTOLIC BLOOD PRESSURE: 82 MMHG | WEIGHT: 250 LBS

## 2022-12-23 DIAGNOSIS — J02.0 STREP PHARYNGITIS: ICD-10-CM

## 2022-12-23 LAB
INT CON NEG: NEGATIVE
INT CON POS: POSITIVE
S PYO AG THROAT QL: POSITIVE

## 2022-12-23 PROCEDURE — 87880 STREP A ASSAY W/OPTIC: CPT

## 2022-12-23 PROCEDURE — 99213 OFFICE O/P EST LOW 20 MIN: CPT

## 2022-12-23 RX ORDER — OLMESARTAN MEDOXOMIL 20 MG/1
20 TABLET ORAL DAILY
COMMUNITY
Start: 2022-11-13

## 2022-12-23 RX ORDER — PENICILLIN V POTASSIUM 500 MG/1
500 TABLET ORAL 3 TIMES DAILY
Qty: 30 TABLET | Refills: 0 | Status: SHIPPED | OUTPATIENT
Start: 2022-12-23 | End: 2023-01-02

## 2022-12-23 ASSESSMENT — ENCOUNTER SYMPTOMS
SINUS PAIN: 0
CHILLS: 1
COUGH: 0
WHEEZING: 0
HEMOPTYSIS: 0
SPUTUM PRODUCTION: 0
SHORTNESS OF BREATH: 0
SORE THROAT: 1

## 2022-12-23 ASSESSMENT — FIBROSIS 4 INDEX: FIB4 SCORE: 0.48

## 2022-12-23 NOTE — PROGRESS NOTES
This is a 37-year-old female subjective:   Daniella Benoit is a 37 y.o. female who presents for Pharyngitis (Body aches, chills x today )      HPI: Who presents today for evaluation of sore throat.  This is a new problem.  Patient reports developing sore throat this morning.  She reports pain is moderate and worsened with swallowing.  She has taken Motrin for this which has been helpful.  She reports history of strep in the past with similar presentation.  Reports associated chills and low-grade fevers.  No other symptoms to report today.      Review of Systems   Constitutional:  Positive for chills and malaise/fatigue.   HENT:  Positive for sore throat. Negative for congestion, ear discharge, ear pain and sinus pain.    Respiratory:  Negative for cough, hemoptysis, sputum production, shortness of breath and wheezing.      Medications:    Current Outpatient Medications on File Prior to Visit   Medication Sig Dispense Refill    olmesartan (BENICAR) 20 MG Tab Take 20 mg by mouth every day.      fluticasone (FLONASE) 50 MCG/ACT nasal spray Administer 1 Spray into affected nostril(S) every day. 16 g 0    methylPREDNISolone (MEDROL DOSEPAK) 4 MG Tablet Therapy Pack Take 1 Tablet by mouth every day. Take with food. (Patient not taking: Reported on 12/23/2022) 21 Each 0    norethindrone-ethinyl estradiol (NORTREL 1-35 TAB) 1-35 MG-MCG per tablet Take 1 Tab by mouth every day. (Patient not taking: Reported on 12/23/2022)       No current facility-administered medications on file prior to visit.        Allergies:   Patient has no known allergies.    Problem List:   Patient Active Problem List   Diagnosis    Contraceptive management    Elevated blood pressure    Body mass index (BMI) of 36.0-36.9 in adult    Obesity (BMI 30-39.9)        Surgical History:  Past Surgical History:   Procedure Laterality Date    ORIF, FRACTURE, METACARPAL BONE         Past Social Hx:   Social History     Tobacco Use    Smoking status: Never     "Smokeless tobacco: Never   Vaping Use    Vaping Use: Never used   Substance Use Topics    Alcohol use: Not Currently     Comment: rarely    Drug use: No          Problem list, medications, and allergies reviewed by myself today in Epic.     Objective:     /82 (BP Location: Left arm, Patient Position: Sitting, BP Cuff Size: Adult)   Pulse (!) 124   Temp 37.6 °C (99.7 °F) (Temporal)   Resp 16   Ht 1.702 m (5' 7\")   Wt 113 kg (250 lb)   SpO2 97%   BMI 39.16 kg/m²     Physical Exam  Vitals and nursing note reviewed.   Constitutional:       General: She is not in acute distress.     Appearance: Normal appearance. She is normal weight. She is not ill-appearing, toxic-appearing or diaphoretic.   HENT:      Head: Normocephalic and atraumatic.      Right Ear: Tympanic membrane, ear canal and external ear normal. There is no impacted cerumen.      Left Ear: Tympanic membrane, ear canal and external ear normal. There is no impacted cerumen.      Nose: Nose normal. No congestion or rhinorrhea.      Mouth/Throat:      Mouth: Mucous membranes are moist.      Pharynx: Oropharynx is clear. Uvula midline. Posterior oropharyngeal erythema present. No oropharyngeal exudate.      Tonsils: No tonsillar exudate. 0 on the right. 0 on the left.   Cardiovascular:      Rate and Rhythm: Normal rate and regular rhythm.      Pulses: Normal pulses.      Heart sounds: Normal heart sounds. No murmur heard.    No friction rub. No gallop.   Pulmonary:      Effort: Pulmonary effort is normal. No respiratory distress.      Breath sounds: Normal breath sounds. No stridor. No wheezing, rhonchi or rales.   Chest:      Chest wall: No tenderness.   Musculoskeletal:      Cervical back: Neck supple. No tenderness.   Lymphadenopathy:      Cervical: No cervical adenopathy.   Skin:     General: Skin is warm and dry.      Capillary Refill: Capillary refill takes less than 2 seconds.   Neurological:      General: No focal deficit present.      Mental " Status: She is alert and oriented to person, place, and time. Mental status is at baseline.      Cranial Nerves: No cranial nerve deficit.      Motor: No weakness.      Gait: Gait normal.   Psychiatric:         Mood and Affect: Mood normal.         Behavior: Behavior normal.         Thought Content: Thought content normal.         Judgment: Judgment normal.       Assessment/Plan:     Diagnosis and associated orders:   1. Strep pharyngitis  penicillin v potassium (VEETID) 500 MG Tab    POCT Rapid Strep A          Comments/MDM:   Pt is clinically stable at today's acute urgent care visit.  No acute distress noted. Appropriate for outpatient management at this time.   Acute problem.  Rapid strep testing is positive in clinic today.  Patient will be treated with penicillin  mg 3 times daily x10 days.  Advised to be taking medication as prescribed, warm fluids, warm salt water gargles, replace toothbrush in 48 hours, alternate Tylenol ibuprofen as needed for pain.  She is to return for any new or worsening signs or symptoms and follow-up with PCP for recheck.  Patient agreed plan of care verbalizes good understanding today.         Discussed DDx, management options (risks,benefits, and alternatives to planned treatment), natural progression and supportive care.  Expressed understanding and the treatment plan was agreed upon. Questions were encouraged and answered   Return to urgent care prn if new or worsening sx or if there is no improvement in condition prn.    Educated in Red flags and indications to immediately call 911 or present to the Emergency Department.   Advised the patient to follow-up with the primary care physician for recheck, reevaluation, and consideration of further management.    I personally reviewed prior external notes and test results pertinent to today's visit.  I have independently reviewed and interpreted all diagnostics ordered during this urgent care acute visit.       Please note that  this dictation was created using voice recognition software. I have made a reasonable attempt to correct obvious errors, but I expect that there are errors of grammar and possibly content that I did not discover before finalizing the note.    This note was electronically signed by SAEID Byrd